# Patient Record
Sex: MALE | Race: WHITE | NOT HISPANIC OR LATINO | URBAN - METROPOLITAN AREA
[De-identification: names, ages, dates, MRNs, and addresses within clinical notes are randomized per-mention and may not be internally consistent; named-entity substitution may affect disease eponyms.]

---

## 2017-08-04 ENCOUNTER — OUTPATIENT (OUTPATIENT)
Dept: OUTPATIENT SERVICES | Facility: HOSPITAL | Age: 67
LOS: 1 days | Discharge: HOME | End: 2017-08-04

## 2017-08-04 DIAGNOSIS — R31.9 HEMATURIA, UNSPECIFIED: ICD-10-CM

## 2017-08-25 ENCOUNTER — OUTPATIENT (OUTPATIENT)
Dept: OUTPATIENT SERVICES | Facility: HOSPITAL | Age: 67
LOS: 1 days | Discharge: HOME | End: 2017-08-25

## 2017-08-25 DIAGNOSIS — R10.9 UNSPECIFIED ABDOMINAL PAIN: ICD-10-CM

## 2018-03-01 ENCOUNTER — INPATIENT (INPATIENT)
Facility: HOSPITAL | Age: 68
LOS: 1 days | Discharge: HOME | End: 2018-03-03
Attending: HOSPITALIST

## 2018-03-01 VITALS
DIASTOLIC BLOOD PRESSURE: 76 MMHG | TEMPERATURE: 101 F | OXYGEN SATURATION: 96 % | HEART RATE: 123 BPM | SYSTOLIC BLOOD PRESSURE: 118 MMHG | RESPIRATION RATE: 18 BRPM

## 2018-03-01 LAB
ALBUMIN SERPL ELPH-MCNC: 3.4 G/DL — SIGNIFICANT CHANGE UP (ref 3–5.5)
ALP SERPL-CCNC: 77 U/L — SIGNIFICANT CHANGE UP (ref 30–115)
ALT FLD-CCNC: 32 U/L — SIGNIFICANT CHANGE UP (ref 0–41)
ANION GAP SERPL CALC-SCNC: 11 MMOL/L — SIGNIFICANT CHANGE UP (ref 7–14)
APTT BLD: 26.4 SEC — LOW (ref 27–39.2)
AST SERPL-CCNC: 41 U/L — SIGNIFICANT CHANGE UP (ref 0–41)
BASOPHILS # BLD AUTO: 0.03 K/UL — SIGNIFICANT CHANGE UP (ref 0–0.2)
BASOPHILS NFR BLD AUTO: 0.3 % — SIGNIFICANT CHANGE UP (ref 0–1)
BILIRUB SERPL-MCNC: 2.3 MG/DL — HIGH (ref 0.2–1.2)
BUN SERPL-MCNC: 11 MG/DL — SIGNIFICANT CHANGE UP (ref 10–20)
CALCIUM SERPL-MCNC: 8.8 MG/DL — SIGNIFICANT CHANGE UP (ref 8.5–10.1)
CHLORIDE SERPL-SCNC: 98 MMOL/L — SIGNIFICANT CHANGE UP (ref 98–110)
CO2 SERPL-SCNC: 22 MMOL/L — SIGNIFICANT CHANGE UP (ref 17–32)
CREAT SERPL-MCNC: 1.4 MG/DL — SIGNIFICANT CHANGE UP (ref 0.7–1.5)
EOSINOPHIL # BLD AUTO: 0.02 K/UL — SIGNIFICANT CHANGE UP (ref 0–0.7)
EOSINOPHIL NFR BLD AUTO: 0.2 % — SIGNIFICANT CHANGE UP (ref 0–8)
GLUCOSE SERPL-MCNC: 116 MG/DL — HIGH (ref 70–110)
HCT VFR BLD CALC: 42.6 % — SIGNIFICANT CHANGE UP (ref 42–52)
HGB BLD-MCNC: 13.8 G/DL — LOW (ref 14–18)
IMM GRANULOCYTES NFR BLD AUTO: 1.3 % — HIGH (ref 0.1–0.3)
INR BLD: 1.12 RATIO — SIGNIFICANT CHANGE UP (ref 0.65–1.3)
LACTATE SERPL-SCNC: 2.6 MMOL/L — HIGH (ref 0.5–2.2)
LYMPHOCYTES # BLD AUTO: 1.47 K/UL — SIGNIFICANT CHANGE UP (ref 1.2–3.4)
LYMPHOCYTES # BLD AUTO: 13 % — LOW (ref 20.5–51.1)
MCHC RBC-ENTMCNC: 25.7 PG — LOW (ref 27–31)
MCHC RBC-ENTMCNC: 32.4 G/DL — SIGNIFICANT CHANGE UP (ref 32–37)
MCV RBC AUTO: 79.3 FL — LOW (ref 80–94)
MONOCYTES # BLD AUTO: 1.28 K/UL — HIGH (ref 0.1–0.6)
MONOCYTES NFR BLD AUTO: 11.3 % — HIGH (ref 1.7–9.3)
NEUTROPHILS # BLD AUTO: 8.4 K/UL — HIGH (ref 1.4–6.5)
NEUTROPHILS NFR BLD AUTO: 73.9 % — SIGNIFICANT CHANGE UP (ref 42.2–75.2)
NRBC # BLD: 0 /100 WBCS — SIGNIFICANT CHANGE UP (ref 0–0)
PLATELET # BLD AUTO: 173 K/UL — SIGNIFICANT CHANGE UP (ref 130–400)
POTASSIUM SERPL-MCNC: 6.3 MMOL/L — CRITICAL HIGH (ref 3.5–5)
POTASSIUM SERPL-SCNC: 6.3 MMOL/L — CRITICAL HIGH (ref 3.5–5)
PROT SERPL-MCNC: 5.9 G/DL — LOW (ref 6–8)
PROTHROM AB SERPL-ACNC: 12.1 SEC — SIGNIFICANT CHANGE UP (ref 9.95–12.87)
RBC # BLD: 5.37 M/UL — SIGNIFICANT CHANGE UP (ref 4.7–6.1)
RBC # FLD: 15.8 % — HIGH (ref 11.5–14.5)
SODIUM SERPL-SCNC: 131 MMOL/L — LOW (ref 135–146)
WBC # BLD: 11.35 K/UL — HIGH (ref 4.8–10.8)
WBC # FLD AUTO: 11.35 K/UL — HIGH (ref 4.8–10.8)

## 2018-03-01 RX ORDER — CIPROFLOXACIN LACTATE 400MG/40ML
400 VIAL (ML) INTRAVENOUS ONCE
Qty: 0 | Refills: 0 | Status: COMPLETED | OUTPATIENT
Start: 2018-03-01 | End: 2018-03-01

## 2018-03-01 RX ORDER — SODIUM CHLORIDE 9 MG/ML
1000 INJECTION INTRAMUSCULAR; INTRAVENOUS; SUBCUTANEOUS
Qty: 0 | Refills: 0 | Status: DISCONTINUED | OUTPATIENT
Start: 2018-03-01 | End: 2018-03-03

## 2018-03-01 RX ORDER — VANCOMYCIN HCL 1 G
1000 VIAL (EA) INTRAVENOUS ONCE
Qty: 0 | Refills: 0 | Status: COMPLETED | OUTPATIENT
Start: 2018-03-01 | End: 2018-03-02

## 2018-03-01 RX ORDER — SODIUM CHLORIDE 9 MG/ML
3 INJECTION INTRAMUSCULAR; INTRAVENOUS; SUBCUTANEOUS ONCE
Qty: 0 | Refills: 0 | Status: COMPLETED | OUTPATIENT
Start: 2018-03-01 | End: 2018-03-01

## 2018-03-01 RX ORDER — ACETAMINOPHEN 500 MG
975 TABLET ORAL ONCE
Qty: 0 | Refills: 0 | Status: COMPLETED | OUTPATIENT
Start: 2018-03-01 | End: 2018-03-01

## 2018-03-01 RX ORDER — DIPHENHYDRAMINE HCL 50 MG
50 CAPSULE ORAL ONCE
Qty: 0 | Refills: 0 | Status: COMPLETED | OUTPATIENT
Start: 2018-03-01 | End: 2018-03-01

## 2018-03-01 RX ADMIN — Medication 975 MILLIGRAM(S): at 19:37

## 2018-03-01 RX ADMIN — Medication 100 MILLIGRAM(S): at 22:28

## 2018-03-01 RX ADMIN — Medication 125 MILLIGRAM(S): at 20:55

## 2018-03-01 RX ADMIN — SODIUM CHLORIDE 1000 MILLILITER(S): 9 INJECTION INTRAMUSCULAR; INTRAVENOUS; SUBCUTANEOUS at 20:55

## 2018-03-01 RX ADMIN — SODIUM CHLORIDE 1000 MILLILITER(S): 9 INJECTION INTRAMUSCULAR; INTRAVENOUS; SUBCUTANEOUS at 23:18

## 2018-03-01 RX ADMIN — Medication 200 MILLIGRAM(S): at 23:03

## 2018-03-01 RX ADMIN — SODIUM CHLORIDE 3 MILLILITER(S): 9 INJECTION INTRAMUSCULAR; INTRAVENOUS; SUBCUTANEOUS at 20:55

## 2018-03-01 RX ADMIN — Medication 50 MILLIGRAM(S): at 23:02

## 2018-03-01 NOTE — ED PROVIDER NOTE - CARE PLAN
Principal Discharge DX:	Nasal septal abscess  Secondary Diagnosis:	S/P hernia repair  Secondary Diagnosis:	HTN (hypertension)

## 2018-03-01 NOTE — ED ADULT TRIAGE NOTE - CHIEF COMPLAINT QUOTE
Pt c/o left sided facial swelling x 1 week, started PO abx on Tuesday, today pt woke up with worsening swelling and pain

## 2018-03-01 NOTE — ED PROVIDER NOTE - ATTENDING CONTRIBUTION TO CARE
patient stated that one week ago started having pain in the nose, which continued, started having swelling, so went to the ENT doctor, was sent home on bactrim, patient had been taking bactrim, but pain and swelling got worse, started having +fever/chills, so went to PMD and was told to come to ED.   PT denies any n/v/neck pain, denies any photophobia, denies any vision changes. Patient is c/o fever, chills, headache, nose pain, bodyaches, but most of his pain is on the nose around and nose area. No rash.   Vitals noted  Patient is awake, alert, o x 3, speaking in full sentences  ANTONY/EOMI  Face: +swelling of the nose with erythema, severe tenderness, no crepitus, +fluctuation, +pus in the nostrils  supple neck  lungs: CTA  abd: +BS, NT, ND, soft  CNS: awake, alert, o x 3, no meningeal signs  skin: no rash  A/P: Nose abscess with fever/chills  failed outpatient abx therapy  labs, IVF, ENT consult  CT  admission for IV abx, ID and ENT consults and treatments

## 2018-03-01 NOTE — ED PROVIDER NOTE - NS ED ROS FT
Eyes:  no visual changes. no eye pain   ENMT:  (+) sinus congestion. (+) sinus drainage.  no otalgia. no sore throat   Cardiac:  no chest pain. no sob  Respiratory:  no cough. no respiratory distress  GI:  (+) decreased appetite.  no nausea/vomiting/diarrhea. no abd pain  :  no dysuria. no burning on urination  MS:  no myalgias. no back pain  Neuro:  (+) headache. no focal weakness  Skin:  (+) facial redness  Endocrine: no history of thyroid disease or diabetes.

## 2018-03-01 NOTE — ED PROVIDER NOTE - MEDICAL DECISION MAKING DETAILS
Patient remained stable in ED, seen by ENT, they did the I&D of the abscess, as recommended is admitted to Medicine, was discussed with MAR and Dr. Soto by Dr. Fay.

## 2018-03-01 NOTE — ED PROVIDER NOTE - PHYSICAL EXAMINATION
CONSTITUTIONAL: well-developed; well-nourished; uncomfortable appearing male with facial infection   SKIN: warm, dry.  erythema of the nasal bridge and surrounding the nose  HEAD: Normocephalic; atraumatic.  EYES: no conj injection  ENT: (+) nasal discharge with swelling of the bridge of the nose; no trismus, airway clear. no mastoid tenderness.  mild maxillary and frontal sinus tenderness  NECK: Supple; non tender. no meningismus  CARD: S1S2 RRR   RESP: No wheezes, rales or rhonchi.  ABD: soft ntnd  EXT: normal rom. 5/5 strength bilaterally.  LYMPH: No acute cervical adenopathy.  NEURO: alert, oriented, grossly unremarkable

## 2018-03-02 DIAGNOSIS — J34.0 ABSCESS, FURUNCLE AND CARBUNCLE OF NOSE: ICD-10-CM

## 2018-03-02 DIAGNOSIS — E11.9 TYPE 2 DIABETES MELLITUS WITHOUT COMPLICATIONS: ICD-10-CM

## 2018-03-02 DIAGNOSIS — Z00.00 ENCOUNTER FOR GENERAL ADULT MEDICAL EXAMINATION WITHOUT ABNORMAL FINDINGS: ICD-10-CM

## 2018-03-02 DIAGNOSIS — Z98.890 OTHER SPECIFIED POSTPROCEDURAL STATES: Chronic | ICD-10-CM

## 2018-03-02 DIAGNOSIS — I10 ESSENTIAL (PRIMARY) HYPERTENSION: ICD-10-CM

## 2018-03-02 LAB
ANION GAP SERPL CALC-SCNC: 7 MMOL/L — SIGNIFICANT CHANGE UP (ref 7–14)
ANION GAP SERPL CALC-SCNC: 7 MMOL/L — SIGNIFICANT CHANGE UP (ref 7–14)
BASOPHILS # BLD AUTO: 0.01 K/UL — SIGNIFICANT CHANGE UP (ref 0–0.2)
BASOPHILS NFR BLD AUTO: 0.1 % — SIGNIFICANT CHANGE UP (ref 0–1)
BUN SERPL-MCNC: 16 MG/DL — SIGNIFICANT CHANGE UP (ref 10–20)
BUN SERPL-MCNC: 17 MG/DL — SIGNIFICANT CHANGE UP (ref 10–20)
CALCIUM SERPL-MCNC: 8.1 MG/DL — LOW (ref 8.5–10.1)
CALCIUM SERPL-MCNC: 8.2 MG/DL — LOW (ref 8.5–10.1)
CHLORIDE SERPL-SCNC: 102 MMOL/L — SIGNIFICANT CHANGE UP (ref 98–110)
CHLORIDE SERPL-SCNC: 104 MMOL/L — SIGNIFICANT CHANGE UP (ref 98–110)
CO2 SERPL-SCNC: 21 MMOL/L — SIGNIFICANT CHANGE UP (ref 17–32)
CO2 SERPL-SCNC: 22 MMOL/L — SIGNIFICANT CHANGE UP (ref 17–32)
CREAT SERPL-MCNC: 1.3 MG/DL — SIGNIFICANT CHANGE UP (ref 0.7–1.5)
CREAT SERPL-MCNC: 1.3 MG/DL — SIGNIFICANT CHANGE UP (ref 0.7–1.5)
EOSINOPHIL # BLD AUTO: 0 K/UL — SIGNIFICANT CHANGE UP (ref 0–0.7)
EOSINOPHIL NFR BLD AUTO: 0 % — SIGNIFICANT CHANGE UP (ref 0–8)
GAS PNL BLDV: SIGNIFICANT CHANGE UP
GLUCOSE SERPL-MCNC: 374 MG/DL — HIGH (ref 70–110)
GLUCOSE SERPL-MCNC: 405 MG/DL — CRITICAL HIGH (ref 70–110)
HCT VFR BLD CALC: 36.9 % — LOW (ref 42–52)
HGB BLD-MCNC: 12.2 G/DL — LOW (ref 14–18)
IMM GRANULOCYTES NFR BLD AUTO: 1 % — HIGH (ref 0.1–0.3)
LYMPHOCYTES # BLD AUTO: 0.42 K/UL — LOW (ref 1.2–3.4)
LYMPHOCYTES # BLD AUTO: 4.1 % — LOW (ref 20.5–51.1)
MCHC RBC-ENTMCNC: 25.7 PG — LOW (ref 27–31)
MCHC RBC-ENTMCNC: 33.1 G/DL — SIGNIFICANT CHANGE UP (ref 32–37)
MCV RBC AUTO: 77.7 FL — LOW (ref 80–94)
MONOCYTES # BLD AUTO: 0.39 K/UL — SIGNIFICANT CHANGE UP (ref 0.1–0.6)
MONOCYTES NFR BLD AUTO: 3.8 % — SIGNIFICANT CHANGE UP (ref 1.7–9.3)
NEUTROPHILS # BLD AUTO: 9.28 K/UL — HIGH (ref 1.4–6.5)
NEUTROPHILS NFR BLD AUTO: 91 % — HIGH (ref 42.2–75.2)
PLATELET # BLD AUTO: 169 K/UL — SIGNIFICANT CHANGE UP (ref 130–400)
POTASSIUM SERPL-MCNC: 4.5 MMOL/L — SIGNIFICANT CHANGE UP (ref 3.5–5)
POTASSIUM SERPL-MCNC: 4.5 MMOL/L — SIGNIFICANT CHANGE UP (ref 3.5–5)
POTASSIUM SERPL-SCNC: 4.5 MMOL/L — SIGNIFICANT CHANGE UP (ref 3.5–5)
POTASSIUM SERPL-SCNC: 4.5 MMOL/L — SIGNIFICANT CHANGE UP (ref 3.5–5)
RBC # BLD: 4.75 M/UL — SIGNIFICANT CHANGE UP (ref 4.7–6.1)
RBC # FLD: 15.5 % — HIGH (ref 11.5–14.5)
SODIUM SERPL-SCNC: 130 MMOL/L — LOW (ref 135–146)
SODIUM SERPL-SCNC: 133 MMOL/L — LOW (ref 135–146)
WBC # BLD: 10.2 K/UL — SIGNIFICANT CHANGE UP (ref 4.8–10.8)
WBC # FLD AUTO: 10.2 K/UL — SIGNIFICANT CHANGE UP (ref 4.8–10.8)

## 2018-03-02 RX ORDER — SODIUM CHLORIDE 9 MG/ML
1000 INJECTION INTRAMUSCULAR; INTRAVENOUS; SUBCUTANEOUS
Qty: 0 | Refills: 0 | Status: DISCONTINUED | OUTPATIENT
Start: 2018-03-02 | End: 2018-03-03

## 2018-03-02 RX ORDER — LISINOPRIL 2.5 MG/1
10 TABLET ORAL DAILY
Qty: 0 | Refills: 0 | Status: DISCONTINUED | OUTPATIENT
Start: 2018-03-02 | End: 2018-03-02

## 2018-03-02 RX ORDER — MORPHINE SULFATE 50 MG/1
4 CAPSULE, EXTENDED RELEASE ORAL EVERY 6 HOURS
Qty: 0 | Refills: 0 | Status: DISCONTINUED | OUTPATIENT
Start: 2018-03-02 | End: 2018-03-03

## 2018-03-02 RX ORDER — DEXTROSE 50 % IN WATER 50 %
1 SYRINGE (ML) INTRAVENOUS ONCE
Qty: 0 | Refills: 0 | Status: DISCONTINUED | OUTPATIENT
Start: 2018-03-02 | End: 2018-03-03

## 2018-03-02 RX ORDER — DEXTROSE 50 % IN WATER 50 %
25 SYRINGE (ML) INTRAVENOUS ONCE
Qty: 0 | Refills: 0 | Status: DISCONTINUED | OUTPATIENT
Start: 2018-03-02 | End: 2018-03-03

## 2018-03-02 RX ORDER — INSULIN LISPRO 100/ML
5 VIAL (ML) SUBCUTANEOUS
Qty: 0 | Refills: 0 | Status: DISCONTINUED | OUTPATIENT
Start: 2018-03-02 | End: 2018-03-03

## 2018-03-02 RX ORDER — MORPHINE SULFATE 50 MG/1
4 CAPSULE, EXTENDED RELEASE ORAL ONCE
Qty: 0 | Refills: 0 | Status: DISCONTINUED | OUTPATIENT
Start: 2018-03-02 | End: 2018-03-02

## 2018-03-02 RX ORDER — INSULIN GLARGINE 100 [IU]/ML
15 INJECTION, SOLUTION SUBCUTANEOUS AT BEDTIME
Qty: 0 | Refills: 0 | Status: DISCONTINUED | OUTPATIENT
Start: 2018-03-02 | End: 2018-03-03

## 2018-03-02 RX ORDER — METOPROLOL TARTRATE 50 MG
0 TABLET ORAL
Qty: 0 | Refills: 0 | COMMUNITY

## 2018-03-02 RX ORDER — SODIUM CHLORIDE 9 MG/ML
1000 INJECTION, SOLUTION INTRAVENOUS
Qty: 0 | Refills: 0 | Status: DISCONTINUED | OUTPATIENT
Start: 2018-03-02 | End: 2018-03-03

## 2018-03-02 RX ORDER — LISINOPRIL 2.5 MG/1
1 TABLET ORAL
Qty: 0 | Refills: 0 | COMMUNITY

## 2018-03-02 RX ORDER — GLUCAGON INJECTION, SOLUTION 0.5 MG/.1ML
1 INJECTION, SOLUTION SUBCUTANEOUS ONCE
Qty: 0 | Refills: 0 | Status: DISCONTINUED | OUTPATIENT
Start: 2018-03-02 | End: 2018-03-03

## 2018-03-02 RX ORDER — INSULIN LISPRO 100/ML
VIAL (ML) SUBCUTANEOUS
Qty: 0 | Refills: 0 | Status: DISCONTINUED | OUTPATIENT
Start: 2018-03-02 | End: 2018-03-03

## 2018-03-02 RX ORDER — HEPARIN SODIUM 5000 [USP'U]/ML
5000 INJECTION INTRAVENOUS; SUBCUTANEOUS EVERY 12 HOURS
Qty: 0 | Refills: 0 | Status: DISCONTINUED | OUTPATIENT
Start: 2018-03-02 | End: 2018-03-03

## 2018-03-02 RX ORDER — DEXTROSE 50 % IN WATER 50 %
12.5 SYRINGE (ML) INTRAVENOUS ONCE
Qty: 0 | Refills: 0 | Status: DISCONTINUED | OUTPATIENT
Start: 2018-03-02 | End: 2018-03-03

## 2018-03-02 RX ADMIN — MORPHINE SULFATE 4 MILLIGRAM(S): 50 CAPSULE, EXTENDED RELEASE ORAL at 03:15

## 2018-03-02 RX ADMIN — Medication 100 MILLIGRAM(S): at 18:00

## 2018-03-02 RX ADMIN — Medication 300 MILLIGRAM(S): at 06:20

## 2018-03-02 RX ADMIN — SODIUM CHLORIDE 75 MILLILITER(S): 9 INJECTION INTRAMUSCULAR; INTRAVENOUS; SUBCUTANEOUS at 18:29

## 2018-03-02 RX ADMIN — Medication 5 UNIT(S): at 17:01

## 2018-03-02 RX ADMIN — HEPARIN SODIUM 5000 UNIT(S): 5000 INJECTION INTRAVENOUS; SUBCUTANEOUS at 17:59

## 2018-03-02 RX ADMIN — Medication 300 MILLIGRAM(S): at 21:29

## 2018-03-02 RX ADMIN — Medication 250 MILLIGRAM(S): at 00:59

## 2018-03-02 RX ADMIN — INSULIN GLARGINE 15 UNIT(S): 100 INJECTION, SOLUTION SUBCUTANEOUS at 22:29

## 2018-03-02 RX ADMIN — Medication 300 MILLIGRAM(S): at 11:16

## 2018-03-02 RX ADMIN — Medication 3: at 17:01

## 2018-03-02 NOTE — H&P ADULT - PROBLEM SELECTOR PLAN 1
sepsis due to nasal abcess with cellulitis  -will use clindamycin to  target MRSA and strep : 300 po q6h  -ID c/s  -f/u with ENT recommendations   -f/u fluid culture and blood culture

## 2018-03-02 NOTE — H&P ADULT - NSHPPHYSICALEXAM_GEN_ALL_CORE
T(C): 37 (03-02-18 @ 00:27), Max: 38.1 (03-01-18 @ 18:25)  HR: 95 (03-02-18 @ 00:27) (95 - 123)  BP: 108/74 (03-02-18 @ 00:27) (108/74 - 118/76)  RR: 18 (03-02-18 @ 00:27) (18 - 18)  SpO2: 95% (03-02-18 @ 00:27) (95% - 96%)    PHYSICAL EXAM:    GENERAL: NAD, well-developed  HEAD:  Atraumatic, Normocephalic  EYES: EOMI, PERRLA, conjunctiva and sclera clear  NECK: Supple, No JVD  CHEST/LUNG: Clear to auscultation bilaterally; No wheeze  HEART: Regular rate and rhythm; No murmurs, rubs, or gallops  ABDOMEN: Soft, Nontender, Nondistended; Bowel sounds present  EXTREMITIES:  2+ Peripheral Pulses, No clubbing, cyanosis, or edema  PSYCH: AAOx3  NEUROLOGY: non-focal  SKIN: No rashes or lesions T(C): 37 (03-02-18 @ 00:27), Max: 38.1 (03-01-18 @ 18:25)  HR: 95 (03-02-18 @ 00:27) (95 - 123)  BP: 108/74 (03-02-18 @ 00:27) (108/74 - 118/76)  RR: 18 (03-02-18 @ 00:27) (18 - 18)  SpO2: 95% (03-02-18 @ 00:27) (95% - 96%)    PHYSICAL EXAM:    GENERAL: NAD, well-developed  HEAD:  nose is erythematous, swollen, tender to touch with lower eyelid swelling  EYES: EOMI, PERRLA, conjunctiva and sclera clear, no pain on eye movements  NECK: Supple, No JVD  CHEST/LUNG: Clear to auscultation bilaterally; No wheeze  HEART: Regular rate and rhythm; No murmurs, rubs, or gallops  ABDOMEN: Soft, Nontender, Nondistended; Bowel sounds present  EXTREMITIES:  2+ Peripheral Pulses, No clubbing, cyanosis, or edema  PSYCH: AAOx3  NEUROLOGY: non-focal  SKIN: as above in HEENT

## 2018-03-02 NOTE — PROGRESS NOTE ADULT - ASSESSMENT
Pt is 67 y.o male with nasal abscess s/p drainage/    ·	cont IV abx - rec continuing Vanco as most likely MRSA  ·	f/u abscess cx  ·	pain control  ·	w/d with attng, will follow

## 2018-03-02 NOTE — PROGRESS NOTE ADULT - SUBJECTIVE AND OBJECTIVE BOX
Pt is 67 y.o male who presented w/ nasal abscess s/p drainage by ENT in ED last night. Pt seen and examined at bedside doing well, pt feels much better this AM. Pt states the pain in his nose extending to his face has resolved, mild pain to L nasal septum. PT denies any drainage from nares b/l.    Vital Signs: T (F): 96.5, Max: 100.6, HR: 84 , BP: 107/64, RR: 18, SpO2: 95%  GEN: NAD, awake and alert.   HEENT: NC/AT, oral mucosa pink, no erythema/edema, uvula midline. + erythema to nasal septum on L, small area of fluctuance noted to anterior portion, no active drainage on palpation. palpated R septum, no drainage from R or contralateral side. minimal TTP over L septum.                          12.2   10.20 )-----------( 169      ( 02 Mar 2018 09:59 )             36.9

## 2018-03-02 NOTE — PROGRESS NOTE ADULT - ASSESSMENT
67 y old man w/ PMH of HTN, hx of prostate cancer presenting w/ above c.c.  hx goes back to 1 week ptp when he started noticing that his nose was swollen and painful, he also started having sever headaches and fevers.  He visited his PMD who prescribed Bactrim, he took for 2 days and stopped and his sx got worse.  He denies vomiting, nausea, blurry vision, neurological sx. he denies any trauma to the nose recently, this is his first time he gets this.    In ED he was found to be febrile and tachycardic , CT maxillo afcial w/ iV contrast showed  : Nasal cellulitis with focal abscess pocket measuring 1.8 x 1.6 x 3.9 cm  within the nasal cavity involving the anterior membranous septum.     Seen by ENT who performed drainage and obtained around 5 cc of brownish fluid.

## 2018-03-02 NOTE — H&P ADULT - NSHPLABSRESULTS_GEN_ALL_CORE
13.8   11.35 )-----------( 173      ( 01 Mar 2018 21:12 )             42.6       03-01    131<L>  |  98  |  11  ----------------------------<  116<H>  6.3<HH>   |  22  |  1.4    Ca    8.8      01 Mar 2018 21:12        TPro  5.9<L>  /  Alb  3.4  /  TBili  2.3<H>  /  DBili  x   /  AST  41  /  ALT  32  /  AlkPhos  77  03-01          VBG - ( 02 Mar 2018 02:01 )  pH: 7.38  /  pCO2: 40    /  pO2: 38    / HCO3: 24    / Base Excess: -1.3  /  SvO2: 72    / Lactate: 1.2        PT/INR - ( 01 Mar 2018 21:12 )   PT: 12.10 sec;   INR: 1.12 ratio         PTT - ( 01 Mar 2018 21:12 )  PTT:26.4 sec    Lactate Trend  03-01 @ 21:12 Lactate:2.6       12 Lead ECG (03.01.18 @ 21:11) >  Sinus tachycardia  Inferior infarct , age undetermined     CT Maxillofacial w/ IV Cont (03.01.18 @ 23:46) >  Nasal cellulitis with focal abscess pocket measuring 1.8 x 1.6 x 3.9 cm   within the nasal cavity involving the anterior membranous septum.   No evidence for sinusitis. 13.8   11.35 )-----------( 173      ( 01 Mar 2018 21:12 )             42.6       03-01    131<L>  |  98  |  11  ----------------------------<  116<H>  6.3<HH>   |  22  |  1.4    Ca    8.8      01 Mar 2018 21:12    Blood Gas Venous - Potassium: 4.7 mmoL/L (03.02.18 @ 02:01)        TPro  5.9<L>  /  Alb  3.4  /  TBili  2.3<H>  /  DBili  x   /  AST  41  /  ALT  32  /  AlkPhos  77  03-01          VBG - ( 02 Mar 2018 02:01 )  pH: 7.38  /  pCO2: 40    /  pO2: 38    / HCO3: 24    / Base Excess: -1.3  /  SvO2: 72    / Lactate: 1.2          PT/INR - ( 01 Mar 2018 21:12 )   PT: 12.10 sec;   INR: 1.12 ratio         PTT - ( 01 Mar 2018 21:12 )  PTT:26.4 sec    Lactate Trend  03-01 @ 21:12 Lactate:2.6       12 Lead ECG (03.01.18 @ 21:11) >  Sinus tachycardia  Inferior infarct , age undetermined     CT Maxillofacial w/ IV Cont (03.01.18 @ 23:46) >  Nasal cellulitis with focal abscess pocket measuring 1.8 x 1.6 x 3.9 cm   within the nasal cavity involving the anterior membranous septum.   No evidence for sinusitis.

## 2018-03-02 NOTE — H&P ADULT - ATTENDING COMMENTS
67 y old man w/ PMH of HTN, hx of prostate cancer presenting w/ above c.c.  hx goes back to 1 week ptp when he started noticing that his nose was swollen and painful, he also started having sever headaches and fevers.  He visited his PMD who prescribed Bactrim, he took for 2 days and stopped and his sx got worse.  He denies vomiting, nausea, blurry vision, neurological sx. he denies any trauma to the nose recently, this is his first time he gets this.  In ED he was found to be febrile and tachycardic , CT maxillo afcial w/ iV contrast showed  : Nasal cellulitis with focal abscess pocket measuring 1.8 x 1.6 x 3.9 cm  within the nasal cavity involving the anterior membranous septum.     Seen by ENT who performed drainage and obtained around 5 cc of brownish fluid. 67 y old man w/ PMH of HTN, hx of prostate cancer presenting w/ above c.c.  hx goes back to 1 week ptp when he started noticing that his nose was swollen and painful, he also started having sever headaches and fevers.  He visited his PMD who prescribed Bactrim, he took for 2 days and stopped and his sx got worse.  He denies vomiting, nausea, blurry vision, neurological sx. he denies any trauma to the nose recently, this is his first time he gets this.  In ED he was found to be febrile and tachycardic , CT maxillo afcial w/ iV contrast showed  : Nasal cellulitis with focal abscess pocket measuring 1.8 x 1.6 x 3.9 cm  within the nasal cavity involving the anterior membranous septum.     Seen by ENT who performed drainage and obtained around 5 cc of brownish fluid.    Physical Exam:    General: WN/WD NAD  Neurology: A&Ox3, nonfocal, follows commands  Eyes: PERRLA/ EOMI  Face/ENT/Neck: Neck supple, trachea midline, No JVD- swelling of the nose and surrounding tissue w/ erythema, mild tenderness ( improved)  Respiratory: CTA B/L, No wheezing, rales, rhonchi  CV: Normal rate regular rhythm, S1S2, no murmurs, rubs or gallops  Abdominal: Soft, NT, ND +BS,   Extremities: No edema, + peripheral pulses  Skin: No Rashes, Hematoma, Ecchymosis  Incisions:   Tubes:    A/P  Facial cellulitis / nasal abscess   - IV abx x 24 hours  - check blood culture and wound culture  -repeat WBC   - ENT follow up  HTN - stable   -c/w outpatient Rx  DVT prophylaxis

## 2018-03-02 NOTE — CONSULT NOTE ADULT - ATTENDING COMMENTS
patient with an anterior septal abcess, drained by ENT PA. Specimen sent for culture.    Antibiotics management as per Dr Owen.    F/U as o/p next week.

## 2018-03-02 NOTE — CONSULT NOTE ADULT - PROBLEM SELECTOR RECOMMENDATION 9
Patient examined and evaluated  Follow-up blood and abscess cultures  Will follow-up with attending for abx recommenations

## 2018-03-02 NOTE — CONSULT NOTE ADULT - SUBJECTIVE AND OBJECTIVE BOX
MARISOL VALENZUELA  67y, Male  Allergy: amoxicillin (Rash)  IV Contrast (Swelling)      HPI:  67 y old man w/ PMH of HTN, hx of prostate cancer presenting w/ above c.c.  hx goes back to 1 week ptp when he started noticing that his nose was swollen and painful, he also started having sever headaches and fevers.  He visited his PMD who prescribed Bactrim, he took for 2 days and stopped and his sx got worse.  He denies vomiting, nausea, blurry vision, neurological sx. he denies any trauma to the nose recently, this is his first time he gets this.    In ED he was found to be febrile and tachycardic , CT maxillo afcial w/ iV contrast showed  : Nasal cellulitis with focal abscess pocket measuring 1.8 x 1.6 x 3.9 cm  within the nasal cavity involving the anterior membranous septum.     Seen by ENT who performed drainage and obtained around 5 cc of brownish fluid.      ED meds:  cipro/ vanco/ levo/ clinda/ 2l nss/ solumedrol/ benadryl/ morphine (02 Mar 2018 02:21)    FAMILY HISTORY:  No pertinent family history in first degree relatives    PAST MEDICAL & SURGICAL HISTORY:  Prostate cancer: in remission  HTN (hypertension)  H/O transurethral resection of prostate  S/P hernia repair        VITALS:  T(F): 96.5, Max: 100.6 (03-01-18 @ 18:25)  HR: 84  BP: 107/64  RR: 18Vital Signs Last 24 Hrs  T(C): 35.8 (02 Mar 2018 05:39), Max: 38.1 (01 Mar 2018 18:25)  T(F): 96.5 (02 Mar 2018 05:39), Max: 100.6 (01 Mar 2018 18:25)  HR: 84 (02 Mar 2018 05:39) (84 - 123)  BP: 107/64 (02 Mar 2018 05:39) (107/64 - 118/76)  BP(mean): --  RR: 18 (02 Mar 2018 00:27) (18 - 18)  SpO2: 95% (02 Mar 2018 00:27) (95% - 96%)    TESTS & MEASUREMENTS:                        12.2   10.20 )-----------( 169      ( 02 Mar 2018 09:59 )             36.9     03-02    130<L>  |  102  |  17  ----------------------------<  405<HH>  4.5   |  21  |  1.3    Ca    8.1<L>      02 Mar 2018 11:18    TPro  5.9<L>  /  Alb  3.4  /  TBili  2.3<H>  /  DBili  x   /  AST  41  /  ALT  32  /  AlkPhos  77  03-01    LIVER FUNCTIONS - ( 01 Mar 2018 21:12 )  Alb: 3.4 g/dL / Pro: 5.9 g/dL / ALK PHOS: 77 U/L / ALT: 32 U/L / AST: 41 U/L / GGT: x                   RADIOLOGY & ADDITIONAL TESTS:    ANTIBIOTICS:  clindamycin   Capsule 300 milliGRAM(s) Oral every 6 hours

## 2018-03-02 NOTE — H&P ADULT - HISTORY OF PRESENT ILLNESS
67 y old man w/ PMH of        ED meds:  cipro/ vanco/ levo/ clinda/ 2l nss/ solumedrol/ benadryl/ morphine 67 y old man w/ PMH of HTN, hx of prostate cancer presenting w/ above c.c.  hx goes back to 1 week ptp when he started noticing that his nose was swollen and painful, he also started having sever headaches and fevers.  He visited his PMD who prescribed Bactrim, he took for 2 days and stopped and his sx got worse.  He denies vomiting, nausea, blurry vision, neurological sx. he denies any trauma to the nose recently, this is his first time he gets this.    In ED he was found to be febrile and tachycardic , CT maxillo afcial w/ iV contrast showed  : Nasal cellulitis with focal abscess pocket measuring 1.8 x 1.6 x 3.9 cm  within the nasal cavity involving the anterior membranous septum.     Seen by ENT who performed drainage and obtained around 5 cc of brownish fluid.      ED meds:  cipro/ vanco/ levo/ clinda/ 2l nss/ solumedrol/ benadryl/ morphine

## 2018-03-02 NOTE — CONSULT NOTE ADULT - PROBLEM SELECTOR RECOMMENDATION 9
Continue IV antibiotic and IV hydration, follow up culture results, Infectious Disease consultation.

## 2018-03-02 NOTE — CONSULT NOTE ADULT - ASSESSMENT
68 yo male with PMH of prostate ca and HTN with nasal septal abscess. Patient is s/p I&D of nasal septal abscess in the ED by ENT team.
Nasal Septal Abscess    After obtaining written informed consent, the left nasal septum was prepped with betadine and injected with 1% Lidocaine. An 11 blade was then used to incise the fluctuant left anterior nasal septum approximately 1.5 cm. A thick whitish/brownish liquid drained and a culture was obtained. Using a suction catheter and "milking" the septum approximately 5 ml of pus drained. Pt tolerated procedure well
Left nare with subcutaneous abscess  S/P debridement.  D/C on po Bactrim 2 DS tabletts q12h for 10 days. warm compressions.    recall prn

## 2018-03-02 NOTE — H&P ADULT - ASSESSMENT
67 y old man with hx of prostate ca and HTN presnting with nasal swelling and erythema for 1 week found to have a nasal abcess s/p I&D in ED by ENT.

## 2018-03-02 NOTE — CONSULT NOTE ADULT - SUBJECTIVE AND OBJECTIVE BOX
MARISOL VALENZUELA  67y, Male  Allergy: amoxicillin (Rash)  IV Contrast (Swelling)    Patient is a 67y old  Male who presents with a chief complaint of left nasal redness and swelling for 1 week (02 Mar 2018 02:21) Patient relates that he is feeling much better this morning. Patient denies any nausea, vomiting, fever, chill, SOB at this time.     HPI:  67 y old man w/ PMH of HTN, hx of prostate cancer presenting w/ above c.c.  hx goes back to 1 week ptp when he started noticing that his nose was swollen and painful, he also started having sever headaches and fevers.  He visited his PMD who prescribed Bactrim, he took for 2 days and stopped and his sx got worse.  He denies vomiting, nausea, blurry vision, neurological sx. he denies any trauma to the nose recently, this is his first time he gets this.    In ED he was found to be febrile and tachycardic , CT maxillo afcial w/ iV contrast showed  : Nasal cellulitis with focal abscess pocket measuring 1.8 x 1.6 x 3.9 cm  within the nasal cavity involving the anterior membranous septum.     Seen by ENT who performed drainage and obtained around 5 cc of brownish fluid.      ED meds:  cipro/ vanco/ levo/ clinda/ 2l nss/ solumedrol/ benadryl/ morphine (02 Mar 2018 02:21)    FAMILY HISTORY:  No pertinent family history in first degree relatives    PAST MEDICAL & SURGICAL HISTORY:  Prostate cancer: in remission  HTN (hypertension)  H/O transurethral resection of prostate  S/P hernia repair        VITALS:  T(F): 96.5, Max: 100.6 (03-01-18 @ 18:25)  HR: 84  BP: 107/64  RR: 18Vital Signs Last 24 Hrs  T(C): 35.8 (02 Mar 2018 05:39), Max: 38.1 (01 Mar 2018 18:25)  T(F): 96.5 (02 Mar 2018 05:39), Max: 100.6 (01 Mar 2018 18:25)  HR: 84 (02 Mar 2018 05:39) (84 - 123)  BP: 107/64 (02 Mar 2018 05:39) (107/64 - 118/76)  BP(mean): --  RR: 18 (02 Mar 2018 00:27) (18 - 18)  SpO2: 95% (02 Mar 2018 00:27) (95% - 96%)    TESTS & MEASUREMENTS:                        12.2   10.20 )-----------( 169      ( 02 Mar 2018 09:59 )             36.9     03-01    131<L>  |  98  |  11  ----------------------------<  116<H>  6.3<HH>   |  22  |  1.4    Ca    8.8      01 Mar 2018 21:12    TPro  5.9<L>  /  Alb  3.4  /  TBili  2.3<H>  /  DBili  x   /  AST  41  /  ALT  32  /  AlkPhos  77  03-01    LIVER FUNCTIONS - ( 01 Mar 2018 21:12 )  Alb: 3.4 g/dL / Pro: 5.9 g/dL / ALK PHOS: 77 U/L / ALT: 32 U/L / AST: 41 U/L / GGT: x                 Physical Examination:  GEN: No Acute distress  LUNGS: Clear to auscultation bilaterally  HEART: s1/s2 present. RRR  ABD: Soft non-tender, non-distended. Bowel sounds present  HEAD/NECK: Nose slight erythema noted. No drainage noted.   NEURO: AAOx3    RADIOLOGY & ADDITIONAL TESTS:  < from: CT Maxillofacial w/ IV Cont (03.01.18 @ 23:46) >  EXAM:  CT MAXILLOFACIAL IC            PROCEDURE DATE:  03/01/2018            INTERPRETATION:  Clinical History / Reason for exam: Nasal cellulitis.   Evaluate for abscess. Evaluate for sinusitis.    Technique: Maxillofacial CT with contrast. Axial CT images were obtained   through the maxillofacial region with 100 cc intravenous contrast.    Sagittal and coronal reformats were obtained.    Correlation is made with a CT scan of the orbits dated 11/21/2009.    Findings:     There is a low-densityrim-enhancing lesion within the anterior   cartilaginous nasal septum measuring 1.8 x 1.8 x 2.8 cm (transverse, AP,   CC) with surrounding inflammatory changes. Small amount of debris noted   within the anterior nasal cavity.    The globes and orbitsare unremarkable.    The visualized brain parenchyma is unremarkable.    The paranasal sinuses are well aerated. Left maxillary periapical lucency   is noted.    No acute fracture is demonstrated.    IMPRESSION:    Rim-enhancing fluid collection of the anterior cartilaginous nasal septum   measuring 1.8 x 1.8 x 2.8 cm with surrounding inflammatory changes.   Findings are most compatible with nasal septal abscess with associated   cellulitis.    Dr. Colette Butler discussed preliminary findings with WOODY GARZA on   3/2/2018 1:17 AM with readback.              COLETTE BUTLER M.D., RESIDENT RADIOLOGIST  This document has been electronically signed.  COLETTE VICENTE M.D., ATTENDING RADIOLOGIST  This document has been electronically signed. Mar  2 2018  7:58AM    < end of copied text >    ANTIBIOTICS:  clindamycin   Capsule 300 milliGRAM(s) Oral every 6 hours

## 2018-03-02 NOTE — H&P ADULT - NSHPREVIEWOFSYSTEMS_GEN_ALL_CORE
REVIEW OF SYSTEMS:    CONSTITUTIONAL: No weakness, fevers or chills  EYES/ENT: No visual changes;  No vertigo or throat pain   NECK: No pain or stiffness  RESPIRATORY: No cough, wheezing, hemoptysis; No shortness of breath  CARDIOVASCULAR: No chest pain or palpitations  GASTROINTESTINAL: No abdominal or epigastric pain. No nausea, vomiting, or hematemesis; No diarrhea or constipation. No melena or hematochezia.  GENITOURINARY: No dysuria, frequency or hematuria  NEUROLOGICAL: No numbness or weakness  SKIN: No itching, rashes REVIEW OF SYSTEMS:    CONSTITUTIONAL: fevers   EYES/ENT: nasal swelling and pain, headache  No visual changes;  NECK: No pain or stiffness  RESPIRATORY: No cough, wheezing, hemoptysis; No shortness of breath  CARDIOVASCULAR: No chest pain or palpitations  GASTROINTESTINAL: No abdominal or epigastric pain. No nausea, vomiting, or hematemesis; No diarrhea or constipation. No melena or hematochezia.  GENITOURINARY: No dysuria, frequency or hematuria  NEUROLOGICAL: No numbness or weakness  SKIN: as above in HPI

## 2018-03-02 NOTE — PROGRESS NOTE ADULT - PROBLEM SELECTOR PLAN 1
S/P I&D   MUCH IMPROVED   CONTINUE WITH CLINDAMYCIN AND DOXYCYCLINE AS PER ID.  FOLLOW OVERNIGHT FOR ANTIBIOTIC TOLERANCE AND IMPROVEMENT.   PLAN FOR DISCHARGE TOMORROW IN AM.

## 2018-03-02 NOTE — PROGRESS NOTE ADULT - PROBLEM SELECTOR PLAN 2
NEWLY DIAGNOSED   PATIENT NOT RECEPTIVE TO INSULIN AS THIS IS ALL NEW TO HIM.   WILL D/C ON DIET MODIFICATION ALONG WITH METFORMIN 500 ONCE DAILY.   FOLLOW WITH PMD FOR GLYCEMIC CONTROL.

## 2018-03-03 ENCOUNTER — TRANSCRIPTION ENCOUNTER (OUTPATIENT)
Age: 68
End: 2018-03-03

## 2018-03-03 VITALS
RESPIRATION RATE: 20 BRPM | HEART RATE: 77 BPM | TEMPERATURE: 97 F | DIASTOLIC BLOOD PRESSURE: 64 MMHG | SYSTOLIC BLOOD PRESSURE: 115 MMHG

## 2018-03-03 LAB
ANION GAP SERPL CALC-SCNC: 11 MMOL/L — SIGNIFICANT CHANGE UP (ref 7–14)
BASOPHILS # BLD AUTO: 0.01 K/UL — SIGNIFICANT CHANGE UP (ref 0–0.2)
BASOPHILS NFR BLD AUTO: 0.1 % — SIGNIFICANT CHANGE UP (ref 0–1)
BUN SERPL-MCNC: 17 MG/DL — SIGNIFICANT CHANGE UP (ref 10–20)
CALCIUM SERPL-MCNC: 8.4 MG/DL — LOW (ref 8.5–10.1)
CHLORIDE SERPL-SCNC: 107 MMOL/L — SIGNIFICANT CHANGE UP (ref 98–110)
CO2 SERPL-SCNC: 22 MMOL/L — SIGNIFICANT CHANGE UP (ref 17–32)
CREAT SERPL-MCNC: 1.1 MG/DL — SIGNIFICANT CHANGE UP (ref 0.7–1.5)
EOSINOPHIL # BLD AUTO: 0.02 K/UL — SIGNIFICANT CHANGE UP (ref 0–0.7)
EOSINOPHIL NFR BLD AUTO: 0.2 % — SIGNIFICANT CHANGE UP (ref 0–8)
GLUCOSE SERPL-MCNC: 80 MG/DL — SIGNIFICANT CHANGE UP (ref 70–110)
HCT VFR BLD CALC: 35.6 % — LOW (ref 42–52)
HGB BLD-MCNC: 11.3 G/DL — LOW (ref 14–18)
IMM GRANULOCYTES NFR BLD AUTO: 0.9 % — HIGH (ref 0.1–0.3)
LYMPHOCYTES # BLD AUTO: 1.24 K/UL — SIGNIFICANT CHANGE UP (ref 1.2–3.4)
LYMPHOCYTES # BLD AUTO: 10.6 % — LOW (ref 20.5–51.1)
MCHC RBC-ENTMCNC: 25.4 PG — LOW (ref 27–31)
MCHC RBC-ENTMCNC: 31.7 G/DL — LOW (ref 32–37)
MCV RBC AUTO: 80 FL — SIGNIFICANT CHANGE UP (ref 80–94)
MONOCYTES # BLD AUTO: 0.94 K/UL — HIGH (ref 0.1–0.6)
MONOCYTES NFR BLD AUTO: 8 % — SIGNIFICANT CHANGE UP (ref 1.7–9.3)
NEUTROPHILS # BLD AUTO: 9.39 K/UL — HIGH (ref 1.4–6.5)
NEUTROPHILS NFR BLD AUTO: 80.2 % — HIGH (ref 42.2–75.2)
NRBC # BLD: 0 /100 WBCS — SIGNIFICANT CHANGE UP (ref 0–0)
PLATELET # BLD AUTO: 182 K/UL — SIGNIFICANT CHANGE UP (ref 130–400)
POTASSIUM SERPL-MCNC: 4.2 MMOL/L — SIGNIFICANT CHANGE UP (ref 3.5–5)
POTASSIUM SERPL-SCNC: 4.2 MMOL/L — SIGNIFICANT CHANGE UP (ref 3.5–5)
RBC # BLD: 4.45 M/UL — LOW (ref 4.7–6.1)
RBC # FLD: 15.5 % — HIGH (ref 11.5–14.5)
SODIUM SERPL-SCNC: 140 MMOL/L — SIGNIFICANT CHANGE UP (ref 135–146)
WBC # BLD: 11.71 K/UL — HIGH (ref 4.8–10.8)
WBC # FLD AUTO: 11.71 K/UL — HIGH (ref 4.8–10.8)

## 2018-03-03 RX ORDER — METFORMIN HYDROCHLORIDE 850 MG/1
1 TABLET ORAL
Qty: 30 | Refills: 0 | OUTPATIENT
Start: 2018-03-03 | End: 2018-03-17

## 2018-03-03 RX ADMIN — Medication 300 MILLIGRAM(S): at 05:34

## 2018-03-03 RX ADMIN — Medication 30 MILLILITER(S): at 01:23

## 2018-03-03 RX ADMIN — Medication 5 UNIT(S): at 07:47

## 2018-03-03 RX ADMIN — Medication 100 MILLIGRAM(S): at 05:33

## 2018-03-03 NOTE — DISCHARGE NOTE ADULT - PLAN OF CARE
Improvement in nasal swelling Complete treatment with oral antibiotics x7 days Stabilization in fingersticks Add Metformin to home medications

## 2018-03-03 NOTE — DISCHARGE NOTE ADULT - HOSPITAL COURSE
67 y old man with Past Medical History of Prostate CA and HTN admitted for nasal swelling, pain, headache, and fevers secondary to nasal abscess.  Status post I&D by ENT (5cc of fluid was removed).  The pt was previously treated with oral Bactrim x48 hours as outpatient without improvement in swelling or tenderness.  His swelling, in fact, extended to the right side of the face.    The pt appears significantly improved following drainage, and switching abx coverage to Clindamycin.  His case was discussed with ENT who advised following abscess cultures though the pt and his wife were insistent on returning home.    The patient's hospital course was also complicated by labile fingersticks which was consistent with new onset DMII.  The pt was started on SQ insulin as inpatient, and will be transitioned to Metformin upon discharge.

## 2018-03-03 NOTE — DISCHARGE NOTE ADULT - PATIENT PORTAL LINK FT
You can access the "[employer] Patient Portal, offered by [facility], by registering with the following website: [email or web address]

## 2018-03-03 NOTE — PROGRESS NOTE ADULT - ASSESSMENT
67 y old man with Past Medical History of Prostate CA and HTN admitted for nasal swelling, pain, headache, and fevers secondary to nasal abscess.  Status post I&D by ENT   Nasal abscess: status post I&D.  Case discussed with Infectious Disease; continue treatment with Clindamycin and Doxycycline.  ENT follow-up appreciated; awaiting results from wound cultures.  For possible discharge home later today  Hyperglycemia/newly diagnosed DMII: continue Lantus/Lispro as inpatient and add Metformin 500mg PO q12 to home medications.  Pt provided with diabetic education  GI/DVT prophylaxis

## 2018-03-03 NOTE — PROGRESS NOTE ADULT - ASSESSMENT
Patient is a 67y old  Male who presents with a chief complaint of pain and tenderness of the nose which began approximately one week ago. Pt went to see Dr Johnson ENT on tuesday who prescribed Bactrim. Pt states that he took one dose of the Bactrim and developed puffiness around the left eye. Pt then took Benadryl and eye swelling resolved. Pt states the pain and redness of his nose worsened yesterday and he decided to come to the Emergency Room. Pt denies any trauma to the nose, no fever, no headache, no blurry vision.   Pt is doing much better less pain and swelling, no active drainage from nose s/p I&D of nasal septal abscess    Cont IV abx until cx sensitivities are back  will ryann Patient is a 67y old  Male who presents with a chief complaint of pain and tenderness of the nose which began approximately one week ago. Pt went to see Dr Johnson ENT on tuesday who prescribed Bactrim. Pt states that he took one dose of the Bactrim and developed puffiness around the left eye. Pt then took Benadryl and eye swelling resolved. Pt states the pain and redness of his nose worsened yesterday and he decided to come to the Emergency Room. Pt denies any trauma to the nose, no fever, no headache, no blurry vision.    Pt is doing much better less pain and swelling, no active drainage from nose s/p I&D of nasal septal abscess    Cont IV abx until cx sensitivities are back  will ryann

## 2018-03-03 NOTE — DISCHARGE NOTE ADULT - CARE PROVIDER_API CALL
Rodger Wright), [facility]  [address] [place] [address]  Phone: [phone]  Fax: [phone]

## 2018-03-03 NOTE — PROGRESS NOTE ADULT - SUBJECTIVE AND OBJECTIVE BOX
MARISOL VALENZUELA [id number]    Hospitalist Note  67 y old man with Past Medical History of Prostate CA and HTN admitted for nasal swelling, pain, headache, and fevers secondary to nasal abscess.  Status post I&D by ENT.    Overnight events/Updates: no new complaints.  The pt reports significant improvement from admission.  No further fevers reported.    Vital Signs Last 24 Hrs  T(C): 36.1 (03 Mar 2018 05:15), Max: 36.3 (02 Mar 2018 18:20)  T(F): 97 (03 Mar 2018 05:15), Max: 97.4 (02 Mar 2018 18:20)  HR: 77 (03 Mar 2018 05:15) (77 - 88)  BP: 115/64 (03 Mar 2018 05:15) (113/67 - 115/64)  BP(mean): --  RR: 20 (03 Mar 2018 05:15) (18 - 22)  SpO2: --    Physical Examination:  General: AAO x 3  HEENT: PERRLA, EOMI, minimal facial swelling  CV= S1 & S2 appreciated  Lungs= CTA BL  Abdominal Examination= + BS, Soft, NT/ND  Extremity Examination= No C/C/E    ROS: No chest pain, no shortness of breath.  All other systems reviewed and are within normal limits except for the complaints in the HPI.    MEDICATIONS  (STANDING):  clindamycin   Capsule 300 milliGRAM(s) Oral every 8 hours  dextrose 5%. 1000 milliLiter(s) (50 mL/Hr) IV Continuous <Continuous>  dextrose 50% Injectable 12.5 Gram(s) IV Push once  dextrose 50% Injectable 25 Gram(s) IV Push once  dextrose 50% Injectable 25 Gram(s) IV Push once  doxycycline hyclate Capsule 100 milliGRAM(s) Oral every 12 hours  heparin  Injectable 5000 Unit(s) SubCutaneous every 12 hours  insulin glargine Injectable (LANTUS) 15 Unit(s) SubCutaneous at bedtime  insulin lispro (HumaLOG) corrective regimen sliding scale   SubCutaneous three times a day before meals  insulin lispro Injectable (HumaLOG) 5 Unit(s) SubCutaneous before breakfast  insulin lispro Injectable (HumaLOG) 5 Unit(s) SubCutaneous before lunch  insulin lispro Injectable (HumaLOG) 5 Unit(s) SubCutaneous before dinner  sodium chloride 0.9%. 1000 milliLiter(s) (75 mL/Hr) IV Continuous <Continuous>  sodium chloride 0.9%. 1000 milliLiter(s) (1000 mL/Hr) IV Continuous <Continuous>  sodium chloride 0.9%. 1000 milliLiter(s) (1000 mL/Hr) IV Continuous <Continuous>    MEDICATIONS  (PRN):  aluminum hydroxide/magnesium hydroxide/simethicone Suspension 30 milliLiter(s) Oral every 6 hours PRN Dyspepsia  dextrose Gel 1 Dose(s) Oral once PRN Blood Glucose LESS THAN 70 milliGRAM(s)/deciliter  glucagon  Injectable 1 milliGRAM(s) IntraMuscular once PRN Glucose LESS THAN 70 milligrams/deciliter  morphine   Solution 4 milliGRAM(s) Oral every 6 hours PRN Moderate Pain (4 - 6)                            12.2   10.20 )-----------( 169      ( 02 Mar 2018 09:59 )             36.9     03-02    130<L>  |  102  |  17  ----------------------------<  405<HH>  4.5   |  21  |  1.3    Ca    8.1<L>      02 Mar 2018 11:18    TPro  5.9<L>  /  Alb  3.4  /  TBili  2.3<H>  /  DBili  x   /  AST  41  /  ALT  32  /  AlkPhos  77  03-01      Case discussed with housestaff & family  RAFAEL Mays [phone]

## 2018-03-03 NOTE — PROGRESS NOTE ADULT - SUBJECTIVE AND OBJECTIVE BOX
After an extended discussion with the ENT PA, the patient and his wife are insistent returning home without culture results.  We will provide abx upon discharge.  Pt aware of the risks and benefits of not waiting for culture results.

## 2018-03-03 NOTE — PROGRESS NOTE ADULT - SUBJECTIVE AND OBJECTIVE BOX
CC: Nasal pain and swelling    HPI: Patient is a 67y old  Male who presents with a chief complaint of pain and tenderness of the nose which began approximately one week ago. Pt went to see Dr Elizabeth TOMLINSON on tuesday who prescribed Bactrim. Pt states that he took one dose of the Bactrim and developed puffiness around the left eye. Pt then took Benadryl and eye swelling resolved. Pt states the pain and redness of his nose worsened yesterday and he decided to come to the Emergency Room. Pt denies any trauma to the nose, no fever, no headache, no blurry vision.   Pt is doing much better less pain and swelling, no active drainage from nose    PAST MEDICAL & SURGICAL HISTORY:  HTN (hypertension)  Hypercholesterolemia  Prostate Cancer s/p Robotic Prostatectomy  Hernia Repair    Allergies    amoxicillin (Rash)  IV Contrast (Swelling)  ?? Bactrim/Sulfur    Intolerances      MEDICATIONS  (STANDING):  morphine  - Injectable 4 milliGRAM(s) IV Push Once  sodium chloride 0.9%. 1000 milliLiter(s) (1000 mL/Hr) IV Continuous <Continuous>  sodium chloride 0.9%. 1000 milliLiter(s) (1000 mL/Hr) IV Continuous <Continuous>    MEDICATIONS:  ASA  Metoproplol  Lisinopril    Social History: ????    ROS: ENT, GI, , CV, Pulm, Neuro, Psych, MS, Heme, Endo, Constitional; all negative except as noted in HPI    Vital Signs Last 24 Hrs  T(C): 37 (02 Mar 2018 00:27), Max: 38.1 (01 Mar 2018 18:25)  T(F): 98.6 (02 Mar 2018 00:27), Max: 100.6 (01 Mar 2018 18:25)  HR: 95 (02 Mar 2018 00:27) (95 - 123)  BP: 108/74 (02 Mar 2018 00:27) (108/74 - 118/76)  BP(mean): --  RR: 18 (02 Mar 2018 00:27) (18 - 18)  SpO2: 95% (02 Mar 2018 00:27) (95% - 96%)                          13.8   11.35 )-----------( 173      ( 01 Mar 2018 21:12 )             42.6    03-01    131<L>  |  98  |  11  ----------------------------<  116<H>  6.3<HH>   |  22  |  1.4    Ca    8.8      01 Mar 2018 21:12    TPro  5.9<L>  /  Alb  3.4  /  TBili  2.3<H>  /  DBili  x   /  AST  41  /  ALT  32  /  AlkPhos  77  03-01   PT/INR - ( 01 Mar 2018 21:12 )   PT: 12.10 sec;   INR: 1.12 ratio         PTT - ( 01 Mar 2018 21:12 )  PTT:26.4 sec    PHYSICAL EXAM:  Gen: NAD, well-developed  Head: Normocephalic, Atraumatic  Face: Symmetrical smile, no periorbital tenderness or swelling.  Nose: There is moderate swelling of the bridge of nose with less tenderness on palpation.   The nares are patent and there is no discharge presently    culture results are still pending CC: Nasal pain and swelling    HPI: Patient is a 67y old  Male who presents with a chief complaint of pain and tenderness of the nose which began approximately one week ago. Pt went to see Dr Elizabeth TOMLINSON on tuesday who prescribed Bactrim. Pt states that he took one dose of the Bactrim and developed puffiness around the left eye. Pt then took Benadryl and eye swelling resolved. Pt states the pain and redness of his nose worsened yesterday and he decided to come to the Emergency Room. Pt denies any trauma to the nose, no fever, no headache, no blurry vision.    Pt is s/p I&D of septal abscess.  Pt is doing much better less pain and swelling, no active drainage from nose  no acute events overnight    PAST MEDICAL & SURGICAL HISTORY:  HTN (hypertension)  Hypercholesterolemia  Prostate Cancer s/p Robotic Prostatectomy  Hernia Repair    Allergies    amoxicillin (Rash)  IV Contrast (Swelling)  ?? Bactrim/Sulfur    Intolerances      MEDICATIONS  (STANDING):  morphine  - Injectable 4 milliGRAM(s) IV Push Once  sodium chloride 0.9%. 1000 milliLiter(s) (1000 mL/Hr) IV Continuous <Continuous>  sodium chloride 0.9%. 1000 milliLiter(s) (1000 mL/Hr) IV Continuous <Continuous>    MEDICATIONS:  ASA  Metoproplol  Lisinopril    Social History: ????    ROS: ENT, GI, , CV, Pulm, Neuro, Psych, MS, Heme, Endo, Constitional; all negative except as noted in HPI    Vital Signs Last 24 Hrs  T(C): 37 (02 Mar 2018 00:27), Max: 38.1 (01 Mar 2018 18:25)  T(F): 98.6 (02 Mar 2018 00:27), Max: 100.6 (01 Mar 2018 18:25)  HR: 95 (02 Mar 2018 00:27) (95 - 123)  BP: 108/74 (02 Mar 2018 00:27) (108/74 - 118/76)  BP(mean): --  RR: 18 (02 Mar 2018 00:27) (18 - 18)  SpO2: 95% (02 Mar 2018 00:27) (95% - 96%)                          13.8   11.35 )-----------( 173      ( 01 Mar 2018 21:12 )             42.6    03-01    131<L>  |  98  |  11  ----------------------------<  116<H>  6.3<HH>   |  22  |  1.4    Ca    8.8      01 Mar 2018 21:12    TPro  5.9<L>  /  Alb  3.4  /  TBili  2.3<H>  /  DBili  x   /  AST  41  /  ALT  32  /  AlkPhos  77  03-01   PT/INR - ( 01 Mar 2018 21:12 )   PT: 12.10 sec;   INR: 1.12 ratio         PTT - ( 01 Mar 2018 21:12 )  PTT:26.4 sec    PHYSICAL EXAM:  Gen: NAD, well-developed  Head: Normocephalic, Atraumatic  Face: Symmetrical smile, no periorbital tenderness or swelling.  Nose: There is moderate swelling of the bridge of nose with less tenderness on palpation.   The nares are patent and there is no discharge presently    culture results are still pending

## 2018-03-03 NOTE — DISCHARGE NOTE ADULT - CARE PLAN
Principal Discharge DX:	Nasal septal abscess  Goal:	Improvement in nasal swelling  Assessment and plan of treatment:	Complete treatment with oral antibiotics x7 days  Secondary Diagnosis:	Diabetes mellitus  Goal:	Stabilization in fingersticks  Assessment and plan of treatment:	Add Metformin to home medications

## 2018-03-03 NOTE — DISCHARGE NOTE ADULT - MEDICATION SUMMARY - MEDICATIONS TO TAKE
I will START or STAY ON the medications listed below when I get home from the hospital:    lisinopril 10 mg oral tablet  -- 1 tab(s) by mouth once a day  -- Indication: For High blood pressure    Glucophage 500 mg oral tablet  -- 1 tab(s) by mouth 2 times a day   -- Check with your doctor before becoming pregnant.  Do not drink alcoholic beverages when taking this medication.  It is very important that you take or use this exactly as directed.  Do not skip doses or discontinue unless directed by your doctor.  Obtain medical advice before taking any non-prescription drugs as some may affect the action of this medication.  Take with food or milk.    -- Indication: For Diabetes mellitus    doxycycline hyclate 100 mg oral capsule  -- 1 cap(s) by mouth 2 times a day   -- Avoid prolonged or excessive exposure to direct and/or artificial sunlight while taking this medication.  Do not take this drug if you are pregnant.  Finish all this medication unless otherwise directed by prescriber.  Medication should be taken with plenty of water.    -- Indication: For NASAL SEPTAL ABCESS    metoprolol  -- Indication: For HIGH BLOOD PRESSURE    Cleocin HCl 300 mg oral capsule  -- 1 cap(s) by mouth 3 times a day   -- Finish all this medication unless otherwise directed by prescriber.  Medication should be taken with plenty of water.    -- Indication: For NASAL SEPTAL ABCESS

## 2018-03-04 LAB
-  AMPICILLIN/SULBACTAM: SIGNIFICANT CHANGE UP
-  CEFAZOLIN: SIGNIFICANT CHANGE UP
-  CIPROFLOXACIN: SIGNIFICANT CHANGE UP
-  CLINDAMYCIN: SIGNIFICANT CHANGE UP
-  DAPTOMYCIN: SIGNIFICANT CHANGE UP
-  ERYTHROMYCIN: SIGNIFICANT CHANGE UP
-  GENTAMICIN: SIGNIFICANT CHANGE UP
-  LEVOFLOXACIN: SIGNIFICANT CHANGE UP
-  LINEZOLID: SIGNIFICANT CHANGE UP
-  MOXIFLOXACIN(AEROBIC): SIGNIFICANT CHANGE UP
-  OXACILLIN: SIGNIFICANT CHANGE UP
-  PENICILLIN: SIGNIFICANT CHANGE UP
-  RIFAMPIN: SIGNIFICANT CHANGE UP
-  TETRACYCLINE: SIGNIFICANT CHANGE UP
-  TRIMETHOPRIM/SULFAMETHOXAZOLE: SIGNIFICANT CHANGE UP
-  VANCOMYCIN: SIGNIFICANT CHANGE UP
METHOD TYPE: SIGNIFICANT CHANGE UP

## 2018-03-05 PROBLEM — Z00.00 ENCOUNTER FOR PREVENTIVE HEALTH EXAMINATION: Status: ACTIVE | Noted: 2018-03-05

## 2018-03-06 PROBLEM — I10 ESSENTIAL (PRIMARY) HYPERTENSION: Chronic | Status: ACTIVE | Noted: 2018-03-01

## 2018-03-07 ENCOUNTER — APPOINTMENT (OUTPATIENT)
Dept: OTOLARYNGOLOGY | Facility: CLINIC | Age: 68
End: 2018-03-07

## 2018-03-07 DIAGNOSIS — Z98.890 OTHER SPECIFIED POSTPROCEDURAL STATES: ICD-10-CM

## 2018-03-07 DIAGNOSIS — E11.9 TYPE 2 DIABETES MELLITUS WITHOUT COMPLICATIONS: ICD-10-CM

## 2018-03-07 DIAGNOSIS — J34.0 ABSCESS, FURUNCLE AND CARBUNCLE OF NOSE: ICD-10-CM

## 2018-03-07 DIAGNOSIS — E78.5 HYPERLIPIDEMIA, UNSPECIFIED: ICD-10-CM

## 2018-03-07 DIAGNOSIS — I10 ESSENTIAL (PRIMARY) HYPERTENSION: ICD-10-CM

## 2018-03-07 DIAGNOSIS — Z90.79 ACQUIRED ABSENCE OF OTHER GENITAL ORGAN(S): ICD-10-CM

## 2018-03-07 DIAGNOSIS — Z85.46 PERSONAL HISTORY OF MALIGNANT NEOPLASM OF PROSTATE: ICD-10-CM

## 2018-03-07 DIAGNOSIS — Z88.1 ALLERGY STATUS TO OTHER ANTIBIOTIC AGENTS STATUS: ICD-10-CM

## 2018-03-07 DIAGNOSIS — R00.0 TACHYCARDIA, UNSPECIFIED: ICD-10-CM

## 2018-03-07 LAB
CULTURE RESULTS: SIGNIFICANT CHANGE UP
CULTURE RESULTS: SIGNIFICANT CHANGE UP
ORGANISM # SPEC MICROSCOPIC CNT: SIGNIFICANT CHANGE UP
ORGANISM # SPEC MICROSCOPIC CNT: SIGNIFICANT CHANGE UP
SPECIMEN SOURCE: SIGNIFICANT CHANGE UP
SPECIMEN SOURCE: SIGNIFICANT CHANGE UP

## 2018-04-13 ENCOUNTER — APPOINTMENT (OUTPATIENT)
Dept: OTOLARYNGOLOGY | Facility: CLINIC | Age: 68
End: 2018-04-13
Payer: COMMERCIAL

## 2018-04-13 VITALS
BODY MASS INDEX: 31.22 KG/M2 | SYSTOLIC BLOOD PRESSURE: 129 MMHG | WEIGHT: 187.39 LBS | HEIGHT: 65 IN | DIASTOLIC BLOOD PRESSURE: 85 MMHG

## 2018-04-13 DIAGNOSIS — J34.0 ABSCESS, FURUNCLE AND CARBUNCLE OF NOSE: ICD-10-CM

## 2018-04-13 DIAGNOSIS — H93.8X3 OTHER SPECIFIED DISORDERS OF EAR, BILATERAL: ICD-10-CM

## 2018-04-13 DIAGNOSIS — C61 MALIGNANT NEOPLASM OF PROSTATE: ICD-10-CM

## 2018-04-13 DIAGNOSIS — R04.0 EPISTAXIS: ICD-10-CM

## 2018-04-13 PROCEDURE — 31238 NSL/SINS NDSC SRG NSL HEMRRG: CPT

## 2018-04-13 PROCEDURE — 99214 OFFICE O/P EST MOD 30 MIN: CPT | Mod: 25

## 2018-04-13 PROCEDURE — 92550 TYMPANOMETRY & REFLEX THRESH: CPT

## 2018-05-21 ENCOUNTER — APPOINTMENT (OUTPATIENT)
Dept: OTOLARYNGOLOGY | Facility: CLINIC | Age: 68
End: 2018-05-21

## 2019-12-20 NOTE — CONSULT NOTE ADULT - SUBJECTIVE AND OBJECTIVE BOX
INTERNAL MEDICINE RESIDENCY ADMISSION H&P     Name: Gladys Polanco  Age & Sex: 44 y o  male   MRN: [id number]  Unit/Bed#: [facility] 421-01   Encounter: [id number]  Primary Care Provider: No primary care provider on file  Code Status: Level 1 - Full Code  Admission Status: INPATIENT   Disposition: Patient requires Med/Surg    ASSESSMENT/PLAN     Principal Problem:    NSTEMI (non-ST elevated myocardial infarction) ([facility] )  Active Problems:    Essential hypertension    Diabetes mellitus type 2, uncontrolled ([place] [id number] )    Snoring    Hyperlipidemia    NSTEMI  Initially presented to [address] [place] [address] with one-week history of chest pain  Peak troponin 3 1  Initial EKG inverted T-waves in inferolateral leads  Status post cardiac catheterization revealed triple-vessel disease, EF 30-35%, dilated  Lad 60-70% diffuse mid, left circumflex 75% distal at home 2, 90% ostial angulated, RCA tortuous, diffuse 50-60%, distal 90-95% diffuse  Given presentation, complicated PCI  Transferred to [facility] for further evaluation for CABG by CT surgery  · Was started on aspirin, IV heparin, high-dose atorvastatin, loaded with ticagrelor  · Heparin stopped, avoidance of any antiplatelets until seen by CT surgery for evaluation  · Echo from today:  Mildly dilated left ventricular cavity, moderate concentric left ventricular hypertrophy, severely reduced left ventricular systolic function with mild global hypokinesis with some regional wall-motion variation  EF 29%  Grade 2 diastolic dysfunction  · Per cardiology note, Cardiac MRI recommended as decreased EF not explained from CAD  · Patient currently asymptomatic, no chest pain, shortness of breath, dyspnea on exertion  · Continue Atorvastatin q h s   And aspirin 81 mg  · Cardiology and CT surgery consult    Essential hypertension  Most recent blood pressure 140/96, somewhat controlled  Admits compliance to home medications  · Continue amlodipine 10 mg q d   · Lisinopril 40 mg q d  · Lopressor 25 mg b i d   · Continue monitor vitals closely  · Cardiology consulted    Diabetes mellitus type 2, uncontrolled  Hemoglobin A1c from 12/18/2019 10 1%  Diagnosed in 2013, was placed on metformin patient admits to noncompliance secondary to financial issues  · Continue to monitor with POCT glucose checks  · Cardiac/carbohydrate level 2 diet  · Continue with Lantus 30 units q h s   · Humalog 10 units t i d  With meals  · Correctional insulin algorithm 3    Hyperlipidemia  · Total cholesterol 166, triglycerides 132, HDL 27,   · Continue atorvastatin 80 mg q h s  Snoring  History of snoring, wakes up multiple times throughout the day, sometimes with headache  · Stop Bang score 7, high risk for sleep apnea  · Recommend outpatient sleep study      VTE Pharmacologic Prophylaxis: Sequential compression device (Venodyne)   VTE Mechanical Prophylaxis: sequential compression device    CHIEF COMPLAINT   No chief complaint on file  HISTORY OF PRESENT ILLNESS     69-year-old male with past medical history of hypertension, type 2 diabetes who presented initially to [address] [place] [address] with about one-week history of chest pain  Found to have poorly controlled diabetes/hypertension  Blood pressure modestly elevated 160-170  EKG there showed ST segment inferior lead changes  Troponin peaked at 3 1, cardiac catheterization revealed triple-vessel disease and patient was transferred to [facility] by fine for further evaluation  Noted a tight pressure sensation substernal region, nonradiating  Worse with exertion, noticed it worsening especially on Sunday during the day  Relieved at rest   Patient was lying in bed most of the day due to the chest pressure/pain  Felt like something on his chest  Denies any episodes like this in the past   States he was diagnosed with both hypertension and diabetes back in 2013  Admits to compliance with hypertension medications  Has stopped taking his metformin for diabetes secondary to financial issues  Denies any other significant past medical good history  Denies any surgeries aside from ankle surgery  Denies smoking cigarettes or tobacco   Admits to some social alcohol use, drinks beer once every few weeks  Admits to daily marijuana use, about 10-15 blunts per day  Patient works for [employer]  Denies any recent illnesses  Denies any headache, nausea, vomiting, chest pain at this time, palpitations, fevers or chills  Patient is aware he is to be evaluated by CT surgery for possible CABG  REVIEW OF SYSTEMS     Review of Systems   Constitutional: Positive for activity change  Cardiovascular: Positive for chest pain  Gastrointestinal: Positive for nausea  Neurological: Positive for weakness  All other systems reviewed and are negative  OBJECTIVE     Vitals:    19   BP: 140/96   Pulse: 101   Resp: 18   Temp: 98 7 °F (37 1 °C)   TempSrc: Oral   SpO2: 98%   Weight: 125 kg (275 lb)   Height: 5' 9" (1 753 m)      Temperature:   Temp (24hrs), Av 5 °F (36 9 °C), Min:98 2 °F (36 8 °C), Max:98 7 °F (37 1 °C)    Temperature: 98 7 °F (37 1 °C)  Intake & Output:  I/O     None        Weights:   IBW: 70 7 kg    Body mass index is 40 61 kg/m²  Weight (last 2 days)     Date/Time   Weight    19   125 (275)            Physical Exam   Constitutional: He is oriented to person, place, and time  No distress  Obese   HENT:   Head: Normocephalic and atraumatic  Mouth/Throat: No oropharyngeal exudate  Eyes: No scleral icterus  Neck: Neck supple  Cardiovascular: Normal rate and regular rhythm  Exam reveals no gallop and no friction rub  No murmur heard  Pulmonary/Chest: Effort normal  He has no wheezes  He exhibits no tenderness  Abdominal:   Obese, nontender no masses appreciated  Genitourinary: No penile tenderness  Musculoskeletal: Normal range of motion   He exhibits no edema or tenderness  Lymphadenopathy:     He has no cervical adenopathy  Neurological: He is alert and oriented to person, place, and time  Skin: No rash noted  Multiple Dark Pigmentation spots noted, bilateral foot in between digits   Psychiatric: He has a normal mood and affect  His behavior is normal    Vitals reviewed  PAST MEDICAL HISTORY     Past Medical History:   Diagnosis Date    CAD (coronary artery disease)     Diabetes mellitus ([address] )     HLD (hyperlipidemia)     Hypertension      PAST SURGICAL HISTORY     Past Surgical History:   Procedure Laterality Date    ANKLE SURGERY Right     FRACTURE SURGERY       SOCIAL & FAMILY HISTORY     Social History     Substance and Sexual Activity   Alcohol Use Never    Frequency: Never    Binge frequency: Never     Social History     Substance and Sexual Activity   Drug Use Yes    Types: Marijuana     Social History     Tobacco Use   Smoking Status Never Smoker   Smokeless Tobacco Never Used     Family History   Problem Relation Age of Onset    Cancer Mother         unknown     LABORATORY DATA     Labs: I have personally reviewed pertinent reports      Results from last 7 days   Lab Units 12/18/19  1629   WBC Thousand/uL 10 84*   HEMOGLOBIN g/dL 16 3   HEMATOCRIT % 48 2   PLATELETS Thousands/uL 273   NEUTROS PCT % 65   MONOS PCT % 8    Results from last 7 days   Lab Units 12/18/19  1629   POTASSIUM mmol/L 4 0   CHLORIDE mmol/L 97*   CO2 mmol/L 29   BUN mg/dL 15   CREATININE mg/dL 1 18   CALCIUM mg/dL 9 5   ALK PHOS U/L 89   ALT U/L 30   AST U/L 19              Results from last 7 days   Lab Units 12/19/19  0823 12/18/19  2356 12/18/19  1629   INR   --   --  1 03   PTT seconds 37 25 30         Results from last 7 days   Lab Units 12/19/19  0544 12/19/19  0216 12/18/19  2314   TROPONIN I ng/mL 2 83* 3 11* 2 57*     Micro:  No results found for: Melissa Lennox, WOUNDCULT, SPUTUMCULTUR  IMAGING & DIAGNOSTIC TESTS     Imaging: I have personally reviewed CC:    HPI: Patient is a 67y old  Male who presents with a chief complaint of pain and tenderness of the nose which began approximately one week ago. Pt went to see Dr Elizabeth TOMLINSON on tuesday who prescribed Bactrim. Pt states that he took one dose of the Bactrim and developed puffiness around the left eye. Pt then took Benadryl and eye swelling resolved. Pt states the pain and redness of his nose worsened yesterday and he decided to come to the Emergency Room. Pt denies any trauma to the nose, no fever, no headache, no blurry vision.    PAST MEDICAL & SURGICAL HISTORY:  HTN (hypertension)  Hypercholesterolemia  Prostate Cancer s/p Robotic Prostatectomy  Hernia Repair    Allergies    amoxicillin (Rash)  IV Contrast (Swelling)  ?? Bactrim/Sulfur    Intolerances      MEDICATIONS  (STANDING):  morphine  - Injectable 4 milliGRAM(s) IV Push Once  sodium chloride 0.9%. 1000 milliLiter(s) (1000 mL/Hr) IV Continuous <Continuous>  sodium chloride 0.9%. 1000 milliLiter(s) (1000 mL/Hr) IV Continuous <Continuous>    MEDICATIONS:  ASA  Metoproplol  Lisinopril    Social History: ????    ROS: ENT, GI, , CV, Pulm, Neuro, Psych, MS, Heme, Endo, Constitional; all negative except as noted in HPI    Vital Signs Last 24 Hrs  T(C): 37 (02 Mar 2018 00:27), Max: 38.1 (01 Mar 2018 18:25)  T(F): 98.6 (02 Mar 2018 00:27), Max: 100.6 (01 Mar 2018 18:25)  HR: 95 (02 Mar 2018 00:27) (95 - 123)  BP: 108/74 (02 Mar 2018 00:27) (108/74 - 118/76)  BP(mean): --  RR: 18 (02 Mar 2018 00:27) (18 - 18)  SpO2: 95% (02 Mar 2018 00:27) (95% - 96%)                          13.8   11.35 )-----------( 173      ( 01 Mar 2018 21:12 )             42.6    03-01    131<L>  |  98  |  11  ----------------------------<  116<H>  6.3<HH>   |  22  |  1.4    Ca    8.8      01 Mar 2018 21:12    TPro  5.9<L>  /  Alb  3.4  /  TBili  2.3<H>  /  DBili  x   /  AST  41  /  ALT  32  /  AlkPhos  77  03-01   PT/INR - ( 01 Mar 2018 21:12 )   PT: 12.10 sec;   INR: 1.12 ratio         PTT - ( 01 Mar 2018 21:12 )  PTT:26.4 sec    PHYSICAL EXAM:  Gen: NAD, well-developed  Head: Normocephalic, Atraumatic  Face: Symmetrical smile, no periorbital tenderness or swelling.  Nose: There is moderate swelling of the bridge of nose with significant tenderness on palpation. Within the nares the nasal septum is fluctuant and tender bilaterally, Left greater than Right, suspicious for abscess. The nares are patent and there is no discharge presently  Eyes: PERRL, EOMI, no scleral injection  Ears: Right - ear canal clear, TM intact without effusion            Left - ear canal clear, TM intact without effusion  Mouth: Mucosa moist, tongue/uvula midline, oropharynx clear  Neck: Flat, supple, no lymphadenopathy, trachea midline, no masses  Resp: breathing easily, no stridor  CV: no peripheral edema/cyanosis    CT scan Maxillo-Facial      ******PRELIMINARY REPORT******    ******PRELIMINARY REPORT******          INTERPRETATION:  ClinicalHistory / Reason for exam: Nasal cellulitis.   Evaluate for abscess. Evaluate for sinusitis.    Technique: Axial CT images were obtained through the maxillofacial region   with 100 cc intravenous contrast.  Sagittal and coronal reformats were   obtained.    Comparison: None.    Findings:     Focal rimmed enhanced fluid collection within the nasal cavity at the   anterior membranous septum measuring 1.8 x 1.6 x 3.9 cm (series 3 image   76), compatible with abscess. There is surrounding soft tissuefat   stranding and edema with skin thickening, compatible with cellulitis. No   bone erosion.    Midline nasal septum.    The paranasal sinuses are unremarkable.  Bilateral frontoethmoidal   recesses and sphenoethmoidal recesses are clear.  The bilateral   ostiomeatal complexes are patent.    The globes and lenses are intact.  The optic nerves and extraocular   muscles are symmetric and within normal limits. Intraconal fat is   unremarkable. There is no retrobulbar hematoma or infiltration.    There are no air-fluid levels or mass lesions.  There is no bony   hypertrophy or erosions.      No acute fracture or dislocation.     Impression:      Nasal cellulitis with focal abscess pocket measuring 1.8 x 1.6 x 3.9 cm   within the nasal cavity involving the anterior membranous septum.     No evidence for sinusitis.        Dr. Colette Butt discussed preliminary findings with WOODY GARZA on   3/2/2018 1:17 AM with readback. pertinent reports  No results found  EKG, Pathology, and Other Studies: I have personally reviewed pertinent reports  ALLERGIES   No Known Allergies  MEDICATIONS PRIOR TO ARRIVAL     Prior to Admission medications    Medication Sig Start Date End Date Taking? Authorizing Provider   amLODIPine (NORVASC) 10 mg tablet Take 10 mg by mouth daily 12/16/16   Historical Provider, MD   hydrochlorothiazide (HYDRODIURIL) 50 mg tablet Take 50 mg by mouth every 24 hours    Historical Provider, MD   lisinopril (ZESTRIL) 40 mg tablet Take 40 mg by mouth daily    Historical Provider, MD   metFORMIN (GLUCOPHAGE) 1000 MG tablet Take 1,000 mg by mouth Every 12 hours    Historical Provider, MD   methocarbamol (ROBAXIN) 750 mg tablet Take 1 tablet (750 mg total) by mouth every 8 (eight) hours for 5 days 5/20/18 5/25/18  Adam Hurley PA-C     MEDICATIONS ADMINISTERED IN LAST 24 HOURS     Medication Administration - last 24 hours from 12/18/2019 2159 to 12/19/2019 2159       Date/Time Order Dose Route Action Action by     12/19/2019 2118 metoprolol tartrate (LOPRESSOR) tablet 25 mg 25 mg Oral Given Greyson Whittington RN        CURRENT MEDICATIONS     Current Facility-Administered Medications:  [START ON 12/20/2019] amLODIPine 10 mg Oral Daily Agustin Taylor MD   [START ON 12/20/2019] aspirin 81 mg Oral Daily MD Heron Johnston ON 12/20/2019] atorvastatin 80 mg Oral Daily With Herberth Rangel MD   insulin glargine 30 Units Subcutaneous HS Agustin Taylor MD   insulin lispro 1-6 Units Subcutaneous 4x Daily (AC & HS) MD Heron Johnston ON 12/20/2019] insulin lispro 10 Units Subcutaneous TID With Meals MD Heron Johnston ON 12/20/2019] lisinopril 40 mg Oral Daily Agustin Taylor MD   metoprolol tartrate 25 mg Oral Q12H [address] Agustin Taylor MD   ondansetron 4 mg Intravenous Q6H PRN Agustin Taylor MD          ondansetron 4 mg Q6H PRN       Admission Time  I spent 45 minutes admitting the patient    This involved direct patient contact where I performed a full history and physical, reviewing previous records, and reviewing laboratory and other diagnostic studies  Portions of the record may have been created with voice recognition software  Occasional wrong word or "sound a like" substitutions may have occurred due to the inherent limitations of voice recognition software    Read the chart carefully and recognize, using context, where substitutions have occurred     ==  John Dutton, [facility]  Internal Medicine Residency PGY-1

## 2022-12-23 NOTE — PROGRESS NOTE ADULT - SUBJECTIVE AND OBJECTIVE BOX
67 y old man w/ PMH of HTN, hx of prostate cancer presenting w/ above c.c.  hx goes back to 1 week ptp when he started noticing that his nose was swollen and painful, he also started having sever headaches and fevers.  He visited his PMD who prescribed Bactrim, he took for 2 days and stopped and his sx got worse.  He denies vomiting, nausea, blurry vision, neurological sx. he denies any trauma to the nose recently, this is his first time he gets this.    In ED he was found to be febrile and tachycardic , CT maxillo afcial w/ iV contrast showed  : Nasal cellulitis with focal abscess pocket measuring 1.8 x 1.6 x 3.9 cm  within the nasal cavity involving the anterior membranous septum.     Seen by ENT who performed drainage and obtained around 5 cc of brownish fluid.      PAST MEDICAL & SURGICAL HISTORY:  Prostate cancer: in remission  HTN (hypertension)  H/O transurethral resection of prostate  S/P hernia repair      MEDICATIONS  (STANDING):  clindamycin   Capsule 300 milliGRAM(s) Oral every 8 hours  doxycycline hyclate Capsule 100 milliGRAM(s) Oral every 12 hours  heparin  Injectable 5000 Unit(s) SubCutaneous every 12 hours  sodium chloride 0.9%. 1000 milliLiter(s) (75 mL/Hr) IV Continuous <Continuous>  sodium chloride 0.9%. 1000 milliLiter(s) (1000 mL/Hr) IV Continuous <Continuous>  sodium chloride 0.9%. 1000 milliLiter(s) (1000 mL/Hr) IV Continuous <Continuous>    MEDICATIONS  (PRN):  morphine   Solution 4 milliGRAM(s) Oral every 6 hours PRN Moderate Pain (4 - 6)      Overnight events:    Vital Signs Last 24 Hrs  T(C): 35.8 (02 Mar 2018 05:39), Max: 38.1 (01 Mar 2018 18:25)  T(F): 96.5 (02 Mar 2018 05:39), Max: 100.6 (01 Mar 2018 18:25)  HR: 84 (02 Mar 2018 05:39) (84 - 123)  BP: 107/64 (02 Mar 2018 05:39) (107/64 - 118/76)  BP(mean): --  RR: 18 (02 Mar 2018 00:27) (18 - 18)  SpO2: 95% (02 Mar 2018 00:27) (95% - 96%)  CAPILLARY BLOOD GLUCOSE        I&O's Summary      Physical Exam:    -     General : NAD     -      HEENT: NECK SOFT NO MASS. NOSE ERYTHEMATOUS HOWEVER IMPROVED.     -      Cardiac: S1 S2 NO MURMUR     -      Pulm: GOOD BILATERAL AIR ENTRY     -      GI: ABD SOFT NON-TENDER     -      Musculoskeletal: wnl    -      Neuro: AAOX3         Labs:                        12.2   10.20 )-----------( 169      ( 02 Mar 2018 09:59 )             36.9             03-02    130<L>  |  102  |  17  ----------------------------<  405<HH>  4.5   |  21  |  1.3    Ca    8.1<L>      02 Mar 2018 11:18    TPro  5.9<L>  /  Alb  3.4  /  TBili  2.3<H>  /  DBili  x   /  AST  41  /  ALT  32  /  AlkPhos  77  03-01    LIVER FUNCTIONS - ( 01 Mar 2018 21:12 )  Alb: 3.4 g/dL / Pro: 5.9 g/dL / ALK PHOS: 77 U/L / ALT: 32 U/L / AST: 41 U/L / GGT: x                 PT/INR - ( 01 Mar 2018 21:12 )   PT: 12.10 sec;   INR: 1.12 ratio         PTT - ( 01 Mar 2018 21:12 )  PTT:26.4 sec              Imaging:    ECG:    T(C): 35.8 (03-02-18 @ 05:39), Max: 38.1 (03-01-18 @ 18:25)  HR: 84 (03-02-18 @ 05:39) (84 - 123)  BP: 107/64 (03-02-18 @ 05:39) (107/64 - 118/76)  RR: 18 (03-02-18 @ 00:27) (18 - 18)  SpO2: 95% (03-02-18 @ 00:27) (95% - 96%) V-Y Plasty Text: The defect edges were debeveled with a #15c scalpel blade.  Given the location of the defect, shape of the defect and the proximity to free margins an V-Y advancement flap was deemed most appropriate.  Using a sterile surgical marker, an appropriate advancement flap was drawn incorporating the defect and placing the expected incisions within the relaxed skin tension lines where possible.    The area thus outlined was incised deep to adipose tissue with a #15 scalpel blade.  The skin margins were undermined to an appropriate distance in all directions utilizing iris scissors.